# Patient Record
Sex: MALE | Race: WHITE | NOT HISPANIC OR LATINO | Employment: OTHER | URBAN - METROPOLITAN AREA
[De-identification: names, ages, dates, MRNs, and addresses within clinical notes are randomized per-mention and may not be internally consistent; named-entity substitution may affect disease eponyms.]

---

## 2023-08-23 ENCOUNTER — OFFICE VISIT (OUTPATIENT)
Dept: PRIMARY CARE | Facility: CLINIC | Age: 78
End: 2023-08-23
Payer: MEDICARE

## 2023-08-23 VITALS
BODY MASS INDEX: 26.28 KG/M2 | SYSTOLIC BLOOD PRESSURE: 134 MMHG | HEIGHT: 69 IN | TEMPERATURE: 97.5 F | DIASTOLIC BLOOD PRESSURE: 78 MMHG | WEIGHT: 177.4 LBS

## 2023-08-23 DIAGNOSIS — C61 MALIGNANT NEOPLASM OF PROSTATE (MULTI): Primary | ICD-10-CM

## 2023-08-23 DIAGNOSIS — E55.9 VITAMIN D DEFICIENCY: ICD-10-CM

## 2023-08-23 DIAGNOSIS — L50.1 IDIOPATHIC URTICARIA: ICD-10-CM

## 2023-08-23 DIAGNOSIS — E78.2 MIXED HYPERLIPIDEMIA: ICD-10-CM

## 2023-08-23 DIAGNOSIS — J44.89 ASTHMA WITH CHRONIC OBSTRUCTIVE PULMONARY DISEASE (COPD) (MULTI): ICD-10-CM

## 2023-08-23 PROBLEM — J45.40 MODERATE PERSISTENT ASTHMA WITHOUT COMPLICATION (HHS-HCC): Status: ACTIVE | Noted: 2023-08-23

## 2023-08-23 PROCEDURE — 99213 OFFICE O/P EST LOW 20 MIN: CPT | Performed by: STUDENT IN AN ORGANIZED HEALTH CARE EDUCATION/TRAINING PROGRAM

## 2023-08-23 PROCEDURE — 1160F RVW MEDS BY RX/DR IN RCRD: CPT | Performed by: STUDENT IN AN ORGANIZED HEALTH CARE EDUCATION/TRAINING PROGRAM

## 2023-08-23 PROCEDURE — 1036F TOBACCO NON-USER: CPT | Performed by: STUDENT IN AN ORGANIZED HEALTH CARE EDUCATION/TRAINING PROGRAM

## 2023-08-23 PROCEDURE — 1159F MED LIST DOCD IN RCRD: CPT | Performed by: STUDENT IN AN ORGANIZED HEALTH CARE EDUCATION/TRAINING PROGRAM

## 2023-08-23 RX ORDER — TAMSULOSIN HYDROCHLORIDE 0.4 MG/1
0.8 CAPSULE ORAL DAILY
Qty: 180 CAPSULE | Refills: 3 | Status: SHIPPED | OUTPATIENT
Start: 2023-08-23

## 2023-08-23 RX ORDER — TAMSULOSIN HYDROCHLORIDE 0.4 MG/1
2 CAPSULE ORAL DAILY
COMMUNITY
Start: 2015-06-11 | End: 2023-08-23 | Stop reason: SDUPTHER

## 2023-08-23 RX ORDER — FLUTICASONE PROPIONATE 220 UG/1
1 AEROSOL, METERED RESPIRATORY (INHALATION) DAILY
COMMUNITY
Start: 2016-02-19 | End: 2024-06-06

## 2023-08-23 RX ORDER — ASPIRIN 81 MG/1
81 TABLET ORAL
COMMUNITY

## 2023-08-23 RX ORDER — OMALIZUMAB 150 MG/ML
INJECTION, SOLUTION SUBCUTANEOUS
COMMUNITY
Start: 2022-08-23

## 2023-08-23 RX ORDER — ALBUTEROL SULFATE 90 UG/1
2 AEROSOL, METERED RESPIRATORY (INHALATION) EVERY 4 HOURS PRN
COMMUNITY
Start: 2017-07-20

## 2023-08-23 RX ORDER — MONTELUKAST SODIUM 10 MG/1
10 TABLET ORAL EVERY 24 HOURS
COMMUNITY
Start: 2013-02-15

## 2023-08-23 RX ORDER — TAMSULOSIN HYDROCHLORIDE 0.4 MG/1
0.8 CAPSULE ORAL DAILY
Qty: 180 CAPSULE | Refills: 3 | Status: SHIPPED | OUTPATIENT
Start: 2023-08-23 | End: 2023-11-29 | Stop reason: SDUPTHER

## 2023-08-23 ASSESSMENT — PATIENT HEALTH QUESTIONNAIRE - PHQ9
2. FEELING DOWN, DEPRESSED OR HOPELESS: NOT AT ALL
1. LITTLE INTEREST OR PLEASURE IN DOING THINGS: NOT AT ALL
SUM OF ALL RESPONSES TO PHQ9 QUESTIONS 1 AND 2: 0

## 2023-08-23 NOTE — PROGRESS NOTES
"Subjective   Patient ID: Moses Castro is a 78 y.o. male who presents for Follow-up ([facility] needs refill).    HPI   Patient here for on tamsulosin. He has been on it since 2015. Had prostate ca. He takes 2 capsules daily.  He does have an allergist and is on xolair. He ended up with chronic urticaria and has been xolair for about a year. He does his inhalers and singulair.     Had recent hernia surgery and cataract surgery as well. Cataracts in June and Hernia in March with Dr Batres.    Review of Systems   Constitutional:  Negative for chills, fatigue and fever.   Respiratory:  Negative for cough, shortness of breath and wheezing.    Cardiovascular:  Negative for chest pain, palpitations and leg swelling.   Gastrointestinal:  Negative for abdominal pain, constipation, diarrhea and nausea.   Genitourinary:  Negative for dysuria, frequency, hematuria and urgency.   Neurological:  Negative for dizziness, numbness and headaches.   Psychiatric/Behavioral:  Negative for dysphoric mood. The patient is not nervous/anxious.        Objective   /78   Temp 36.4 °C (97.5 °F)   Ht 1.759 m (5' 9.25\")   Wt 80.5 kg (177 lb 6.4 oz)   BMI 26.01 kg/m²     Physical Exam  Constitutional:       Appearance: Normal appearance.   HENT:      Head: Normocephalic and atraumatic.   Eyes:      Extraocular Movements: Extraocular movements intact.      Pupils: Pupils are equal, round, and reactive to light.   Cardiovascular:      Rate and Rhythm: Normal rate and regular rhythm.      Heart sounds: Normal heart sounds. No murmur heard.  Pulmonary:      Effort: Pulmonary effort is normal.      Breath sounds: Normal breath sounds. No wheezing.   Abdominal:      General: Bowel sounds are normal.      Palpations: Abdomen is soft.      Tenderness: There is no abdominal tenderness. There is no guarding.   Musculoskeletal:         General: Normal range of motion.   Skin:     General: Skin is warm and dry.   Neurological:      General: No focal " deficit present.      Mental Status: He is alert and oriented to person, place, and time.   Psychiatric:         Mood and Affect: Mood normal.         Behavior: Behavior normal.         Assessment/Plan   Problem List Items Addressed This Visit       Idiopathic urticaria    Malignant neoplasm of prostate (CMS/[facility]) - Primary    Relevant Medications    tamsulosin (Flomax) 0.4 mg 24 hr capsule    Other Relevant Orders    PSA    Vitamin D deficiency    Relevant Orders    Vitamin D 25 hydroxy    Asthma with chronic obstructive pulmonary disease (COPD) (CMS/[facility])     Other Visit Diagnoses       Mixed hyperlipidemia        Relevant Orders    Lipid Panel    Comprehensive Metabolic Panel    CBC          Medications refilled in office today.       Patient understands and agrees with treatment plan    Lisa Mina,    08/24/23

## 2023-08-23 NOTE — PATIENT INSTRUCTIONS
Tamsulosin refilled  It was very nice to meet you, we will see you back in a few months for Medicare physical with fasting blood work a few days ahead of time

## 2023-08-24 ASSESSMENT — ENCOUNTER SYMPTOMS
DYSPHORIC MOOD: 0
COUGH: 0
CONSTIPATION: 0
DYSURIA: 0
FEVER: 0
FREQUENCY: 0
DIARRHEA: 0
HEADACHES: 0
PALPITATIONS: 0
NERVOUS/ANXIOUS: 0
NAUSEA: 0
ABDOMINAL PAIN: 0
CHILLS: 0
SHORTNESS OF BREATH: 0
WHEEZING: 0
HEMATURIA: 0
NUMBNESS: 0
DIZZINESS: 0
FATIGUE: 0

## 2023-11-21 ENCOUNTER — LAB (OUTPATIENT)
Dept: LAB | Facility: LAB | Age: 78
End: 2023-11-21
Payer: MEDICARE

## 2023-11-21 DIAGNOSIS — E55.9 VITAMIN D DEFICIENCY: ICD-10-CM

## 2023-11-21 DIAGNOSIS — E78.2 MIXED HYPERLIPIDEMIA: ICD-10-CM

## 2023-11-21 DIAGNOSIS — C61 MALIGNANT NEOPLASM OF PROSTATE (MULTI): ICD-10-CM

## 2023-11-21 LAB
25(OH)D3 SERPL-MCNC: 24 NG/ML (ref 30–100)
ALBUMIN SERPL BCP-MCNC: 4.4 G/DL (ref 3.4–5)
ALP SERPL-CCNC: 62 U/L (ref 33–136)
ALT SERPL W P-5'-P-CCNC: 23 U/L (ref 10–52)
ANION GAP SERPL CALC-SCNC: 11 MMOL/L (ref 10–20)
AST SERPL W P-5'-P-CCNC: 19 U/L (ref 9–39)
BILIRUB SERPL-MCNC: 0.7 MG/DL (ref 0–1.2)
BUN SERPL-MCNC: 20 MG/DL (ref 6–23)
CALCIUM SERPL-MCNC: 9.1 MG/DL (ref 8.6–10.3)
CHLORIDE SERPL-SCNC: 104 MMOL/L (ref 98–107)
CHOLEST SERPL-MCNC: 242 MG/DL (ref 0–199)
CHOLESTEROL/HDL RATIO: 4
CO2 SERPL-SCNC: 30 MMOL/L (ref 21–32)
CREAT SERPL-MCNC: 1.04 MG/DL (ref 0.5–1.3)
ERYTHROCYTE [DISTWIDTH] IN BLOOD BY AUTOMATED COUNT: 14.1 % (ref 11.5–14.5)
GFR SERPL CREATININE-BSD FRML MDRD: 73 ML/MIN/1.73M*2
GLUCOSE SERPL-MCNC: 99 MG/DL (ref 74–99)
HCT VFR BLD AUTO: 51.3 % (ref 41–52)
HDLC SERPL-MCNC: 60.5 MG/DL
HGB BLD-MCNC: 16.8 G/DL (ref 13.5–17.5)
LDLC SERPL CALC-MCNC: 156 MG/DL
MCH RBC QN AUTO: 31.2 PG (ref 26–34)
MCHC RBC AUTO-ENTMCNC: 32.7 G/DL (ref 32–36)
MCV RBC AUTO: 95 FL (ref 80–100)
NON HDL CHOLESTEROL: 182 MG/DL (ref 0–149)
NRBC BLD-RTO: 0 /100 WBCS (ref 0–0)
PLATELET # BLD AUTO: 216 X10*3/UL (ref 150–450)
POTASSIUM SERPL-SCNC: 4.9 MMOL/L (ref 3.5–5.3)
PROT SERPL-MCNC: 6.3 G/DL (ref 6.4–8.2)
PSA SERPL-MCNC: <0.01 NG/ML
RBC # BLD AUTO: 5.39 X10*6/UL (ref 4.5–5.9)
SODIUM SERPL-SCNC: 140 MMOL/L (ref 136–145)
TRIGL SERPL-MCNC: 129 MG/DL (ref 0–149)
VLDL: 26 MG/DL (ref 0–40)
WBC # BLD AUTO: 5.2 X10*3/UL (ref 4.4–11.3)

## 2023-11-21 PROCEDURE — 84153 ASSAY OF PSA TOTAL: CPT

## 2023-11-21 PROCEDURE — 36415 COLL VENOUS BLD VENIPUNCTURE: CPT

## 2023-11-21 PROCEDURE — 80053 COMPREHEN METABOLIC PANEL: CPT

## 2023-11-21 PROCEDURE — 80061 LIPID PANEL: CPT

## 2023-11-21 PROCEDURE — 85027 COMPLETE CBC AUTOMATED: CPT

## 2023-11-21 PROCEDURE — 82306 VITAMIN D 25 HYDROXY: CPT

## 2023-11-29 ENCOUNTER — OFFICE VISIT (OUTPATIENT)
Dept: PRIMARY CARE | Facility: CLINIC | Age: 78
End: 2023-11-29
Payer: MEDICARE

## 2023-11-29 VITALS
TEMPERATURE: 97.9 F | BODY MASS INDEX: 26.36 KG/M2 | WEIGHT: 178 LBS | SYSTOLIC BLOOD PRESSURE: 138 MMHG | DIASTOLIC BLOOD PRESSURE: 64 MMHG | OXYGEN SATURATION: 95 % | HEIGHT: 69 IN | HEART RATE: 81 BPM

## 2023-11-29 DIAGNOSIS — M77.11 LATERAL EPICONDYLITIS OF RIGHT ELBOW: ICD-10-CM

## 2023-11-29 DIAGNOSIS — Z00.00 MEDICARE ANNUAL WELLNESS VISIT, SUBSEQUENT: Primary | ICD-10-CM

## 2023-11-29 DIAGNOSIS — E55.9 VITAMIN D DEFICIENCY: ICD-10-CM

## 2023-11-29 DIAGNOSIS — H61.21 IMPACTED CERUMEN OF RIGHT EAR: ICD-10-CM

## 2023-11-29 PROCEDURE — 69209 REMOVE IMPACTED EAR WAX UNI: CPT | Performed by: STUDENT IN AN ORGANIZED HEALTH CARE EDUCATION/TRAINING PROGRAM

## 2023-11-29 PROCEDURE — 1036F TOBACCO NON-USER: CPT | Performed by: STUDENT IN AN ORGANIZED HEALTH CARE EDUCATION/TRAINING PROGRAM

## 2023-11-29 PROCEDURE — 99214 OFFICE O/P EST MOD 30 MIN: CPT | Performed by: STUDENT IN AN ORGANIZED HEALTH CARE EDUCATION/TRAINING PROGRAM

## 2023-11-29 PROCEDURE — 1160F RVW MEDS BY RX/DR IN RCRD: CPT | Performed by: STUDENT IN AN ORGANIZED HEALTH CARE EDUCATION/TRAINING PROGRAM

## 2023-11-29 PROCEDURE — 1159F MED LIST DOCD IN RCRD: CPT | Performed by: STUDENT IN AN ORGANIZED HEALTH CARE EDUCATION/TRAINING PROGRAM

## 2023-11-29 PROCEDURE — 1170F FXNL STATUS ASSESSED: CPT | Performed by: STUDENT IN AN ORGANIZED HEALTH CARE EDUCATION/TRAINING PROGRAM

## 2023-11-29 PROCEDURE — G0439 PPPS, SUBSEQ VISIT: HCPCS | Performed by: STUDENT IN AN ORGANIZED HEALTH CARE EDUCATION/TRAINING PROGRAM

## 2023-11-29 ASSESSMENT — ENCOUNTER SYMPTOMS
NUMBNESS: 0
DIARRHEA: 0
DYSURIA: 0
NERVOUS/ANXIOUS: 0
WHEEZING: 0
CHILLS: 0
HEADACHES: 0
OCCASIONAL FEELINGS OF UNSTEADINESS: 0
NAUSEA: 0
SHORTNESS OF BREATH: 0
HEMATURIA: 0
PALPITATIONS: 0
DIZZINESS: 0
FREQUENCY: 0
LOSS OF SENSATION IN FEET: 0
DYSPHORIC MOOD: 0
FEVER: 0
ABDOMINAL PAIN: 0
CONSTIPATION: 0
FATIGUE: 0
DEPRESSION: 0
COUGH: 0

## 2023-11-29 ASSESSMENT — PATIENT HEALTH QUESTIONNAIRE - PHQ9
1. LITTLE INTEREST OR PLEASURE IN DOING THINGS: NOT AT ALL
2. FEELING DOWN, DEPRESSED OR HOPELESS: NOT AT ALL
SUM OF ALL RESPONSES TO PHQ9 QUESTIONS 1 AND 2: 0

## 2023-11-29 ASSESSMENT — ACTIVITIES OF DAILY LIVING (ADL)
DOING_HOUSEWORK: INDEPENDENT
TAKING_MEDICATION: INDEPENDENT
BATHING: INDEPENDENT
MANAGING_FINANCES: INDEPENDENT
DRESSING: INDEPENDENT
GROCERY_SHOPPING: INDEPENDENT

## 2023-11-29 NOTE — PATIENT INSTRUCTIONS
Recommendations for men annual wellness exam:   Colonoscopy at age 45 or earlier if positive family history of colon cancer   Discuss prostate cancer screening between ages 55-69 or sooner if family history of prostate cancer   One time screen for abdominal aortic aneurysm for men ages 65-75 who have ever smoked  Screening for lung cancer with low-dose CT in all adults age 55-77 who have a 30 pack-year smoking history and currently smoke or have quit within the past 15 years  Follow a healthy diet (Dash diet, Mediterranean diet)  Exercise 150 min/wk  Maintain healthy weight (BMI < 25)  Do not smoke   Alcohol in moderation (up to 2 drinks/day)   Get enough sleep (7-8 hours/night)  Make sure immunizations are up to date (influenza, pneumococcal, Tdap, covid, shingles if age > 50, RSV if >60)  Visit dentist twice yearly    Home exercises given for tennis elbow.  If not improving can consider occupational/physical therapy and/or a corticosteroid injection    Recommend 4000 units of vitamin D daily

## 2023-11-29 NOTE — PROGRESS NOTES
Subjective   Reason for Visit: Moses Castro is an 78 y.o. male here for a Medicare Wellness visit.     Reviewed all medications by prescribing practitioner or clinical pharmacist (such as prescriptions, OTCs, herbal therapies and supplements) and documented in the medical record.    HPI  Specialists seen by patient: Ophthalmology  -derm  -allergist, xolair shots for idiopathic urticaria    Hx of colon ca screening: n/a  History of depression or anxiety:no  Immunizations: up to date  Diet: Follows a healthy diet  Exercise: Gets regular exercise, walks 1 1/2 miles daily   Alcohol abuse screen:   none   How many times in the past year 5 or more drinks in a day? 0  Lung cancer screening:   Smoking history: ex-smoker and quit in 1968  AAA US: not applicable  Drug use: No    Patient Self Assessment of Health Status  Patient Self Assessment: Good  Functional Ability/Level of Safety  Cognitive Impairment Observed: No cognitive impairment observed  Falls Home Safety Risk Factors  Home Safety Risk Factors: None  Nutrition and Exercise  Current Diet: Well Balanced Diet  Adequate Fluid Intake: Yes  Caffeine: Yes  Exercise Frequency: Regularly  ADL Screening  Hearing - Right Ear: Functional  Hearing - Left Ear: Functional  Bathing: Independent  Dressing: Independent  Walks in Home: Independent  IADL's  Managing Finances: Independent  Grocery Shopping: Independent  Taking Medication: Independent  Doing Housework: Independent      Patient also having issues with her right lateral elbow pain.  Has had tennis elbow in the past and it would normally go away on its own but this 1 has been persisting for several months.  He takes ibuprofen with mild relief but the pain returned.  He is very active and golfs.  Worse with opening jars and turning door handles.    Patient Care Team:  Lisa Mina DO as PCP - General (Family Medicine)  Carlos Snyder MD as PCP - [facility] Attributed Provider     Review of Systems   Constitutional:  " Negative for chills, fatigue and fever.   Respiratory:  Negative for cough, shortness of breath and wheezing.    Cardiovascular:  Negative for chest pain, palpitations and leg swelling.   Gastrointestinal:  Negative for abdominal pain, constipation, diarrhea and nausea.   Genitourinary:  Negative for dysuria, frequency, hematuria and urgency.   Neurological:  Negative for dizziness, numbness and headaches.   Psychiatric/Behavioral:  Negative for dysphoric mood. The patient is not nervous/anxious.        Objective   Vitals:  /64 (BP Location: Left arm, Patient Position: Sitting, BP Cuff Size: Adult)   Pulse 81   Temp 36.6 °C (97.9 °F) (Skin)   Ht 1.74 m (5' 8.5\")   Wt 80.7 kg (178 lb)   SpO2 95%   BMI 26.67 kg/m²       Physical Exam  Constitutional:       General: He is not in acute distress.     Appearance: Normal appearance.   HENT:      Head: Normocephalic and atraumatic.      Right Ear: There is impacted cerumen.      Left Ear: Tympanic membrane and ear canal normal.      Mouth/Throat:      Mouth: Mucous membranes are moist.      Pharynx: No posterior oropharyngeal erythema.   Eyes:      Extraocular Movements: Extraocular movements intact.      Pupils: Pupils are equal, round, and reactive to light.   Cardiovascular:      Rate and Rhythm: Normal rate and regular rhythm.      Heart sounds: No murmur heard.  Pulmonary:      Effort: Pulmonary effort is normal. No respiratory distress.      Breath sounds: Normal breath sounds. No wheezing.   Abdominal:      General: Bowel sounds are normal.      Palpations: Abdomen is soft.      Tenderness: There is no abdominal tenderness. There is no guarding.   Musculoskeletal:         General: Normal range of motion.      Right elbow: No swelling or effusion. Normal range of motion. Tenderness present in lateral epicondyle.      Left elbow: No swelling or effusion. Normal range of motion. No tenderness.      Cervical back: Neck supple.      Comments: Strength 5/5.  " Increased pain with resisted wrist extension, pronation, and middle finger extension.   Skin:     General: Skin is warm and dry.   Neurological:      General: No focal deficit present.      Mental Status: He is alert and oriented to person, place, and time.   Psychiatric:         Mood and Affect: Mood normal.         Behavior: Behavior normal.       Assessment/Plan   Problem List Items Addressed This Visit       Vitamin D deficiency     Other Visit Diagnoses       Medicare annual wellness visit, subsequent    -  Primary    Impacted cerumen of right ear        Relevant Orders    Ear Cerumen Removal    Lateral epicondylitis of right elbow              We will obtain fasting blood work.  Results will be communicated to the patient via MyChart or a letter.   We reviewed appropriate preventative health screening guidelines. The patient is  up-to-date on vaccinations, recommended n/a.  We discussed regular aerobic exercise. We discussed proper nutrition and weight control.     Home exercises given for lateral epicondylitis.  If not improving can consider occupational therapy and/or corticosteroid injection.  He will let me know    Recommended 4000 units of vitamin D daily

## 2023-11-29 NOTE — PROGRESS NOTES
Patient ID: Moses Castro is a 78 y.o. male.    Ear Cerumen Removal    Date/Time: 11/29/2023 8:25 AM    Performed by: Lisa Mina DO  Authorized by: Lisa Mina DO    Consent:     Consent obtained:  Verbal    Consent given by:  Patient    Risks, benefits, and alternatives were discussed: yes    Universal protocol:     Patient identity confirmed:  Verbally with patient  Procedure details:     Location:  R ear    Procedure type: irrigation      Procedure outcomes: cerumen removed    Post-procedure details:     Inspection:  TM intact    Hearing quality:  Normal    Procedure completion:  Tolerated

## 2024-08-25 DIAGNOSIS — C61 MALIGNANT NEOPLASM OF PROSTATE (MULTI): ICD-10-CM

## 2024-08-26 RX ORDER — TAMSULOSIN HYDROCHLORIDE 0.4 MG/1
0.8 CAPSULE ORAL DAILY
Qty: 180 CAPSULE | Refills: 3 | Status: SHIPPED | OUTPATIENT
Start: 2024-08-26

## 2024-09-04 NOTE — PROGRESS NOTES
HPI:  Proc (7/23/15): prostate biopsy performed by Dr. 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 Patient was a previous patient of Dr. Jasiel Melendez. Hx of asthma, COPD. Reports LUTS and intermittent hematuria (last episode 4 years ago). Nocturia 2-3x. Patient self-catheterizes. Patient is having hernia repair soon.    PSA: <0.10 (11/21/24)    Review of Systems:  All systems reviewed. Anything negative noted in the HPI.    Physical Exam:  Vitals signs reviewed.  Constitutional:      Appearance: Well-developed.  HENT:     Head: Normocephalic and atraumatic.  Neck:     Musculoskeletal: Normal range of motion.  Pulmonary:     Effort: Pulmonary effort is normal.  Musculoskeletal: Normal range of motion.  Skin:     General: Skin is warm and dry.  Neurological:     Mental Status: Alert and oriented to person, place, and time.  Psychiatric:        Behavior: Behavior normal.        Thought Content: Thought content normal.        Judgment: Judgment normal.    Procedures:    Assessment/Plan   Moses Castro is a 79 y.o. 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 Patient was a previous patient of Dr. Jasiel Melendez. Hx of asthma, COPD. Reports LUTS and intermittent hematuria (last episode 4 years ago). Nocturia 2-3x. Patient self-catheterizes. Management options including risks, benefits and alternatives discussed at length and all questions answered. Patient prefers to proceed with follow-up as needed.             Scribe Attestation:  By signing my name below, Suni MELVIN Scribe   attest that this documentation has been prepared under the direction and in the presence of Lalo Quesada MD.

## 2024-09-05 ENCOUNTER — APPOINTMENT (OUTPATIENT)
Dept: UROLOGY | Facility: CLINIC | Age: 79
End: 2024-09-05
Payer: MEDICARE

## 2024-09-05 VITALS — TEMPERATURE: 97.8 F | BODY MASS INDEX: 25.91 KG/M2 | WEIGHT: 181 LBS | HEIGHT: 70 IN

## 2024-09-05 DIAGNOSIS — C61 MALIGNANT NEOPLASM OF PROSTATE (MULTI): Primary | ICD-10-CM

## 2024-09-05 LAB
POC APPEARANCE, URINE: CLEAR
POC BILIRUBIN, URINE: NEGATIVE
POC BLOOD, URINE: NEGATIVE
POC COLOR, URINE: YELLOW
POC GLUCOSE, URINE: NEGATIVE MG/DL
POC KETONES, URINE: NEGATIVE MG/DL
POC LEUKOCYTES, URINE: NEGATIVE
POC NITRITE,URINE: NEGATIVE
POC PH, URINE: 7 PH
POC PROTEIN, URINE: NEGATIVE MG/DL
POC SPECIFIC GRAVITY, URINE: 1.01
POC UROBILINOGEN, URINE: 0.2 EU/DL

## 2024-09-05 PROCEDURE — 81002 URINALYSIS NONAUTO W/O SCOPE: CPT | Performed by: STUDENT IN AN ORGANIZED HEALTH CARE EDUCATION/TRAINING PROGRAM

## 2024-09-05 PROCEDURE — 51798 US URINE CAPACITY MEASURE: CPT | Performed by: STUDENT IN AN ORGANIZED HEALTH CARE EDUCATION/TRAINING PROGRAM

## 2024-09-05 PROCEDURE — 1159F MED LIST DOCD IN RCRD: CPT | Performed by: STUDENT IN AN ORGANIZED HEALTH CARE EDUCATION/TRAINING PROGRAM

## 2024-09-05 PROCEDURE — 99204 OFFICE O/P NEW MOD 45 MIN: CPT | Performed by: STUDENT IN AN ORGANIZED HEALTH CARE EDUCATION/TRAINING PROGRAM

## 2024-09-05 PROCEDURE — 1126F AMNT PAIN NOTED NONE PRSNT: CPT | Performed by: STUDENT IN AN ORGANIZED HEALTH CARE EDUCATION/TRAINING PROGRAM

## 2024-09-05 PROCEDURE — G2211 COMPLEX E/M VISIT ADD ON: HCPCS | Performed by: STUDENT IN AN ORGANIZED HEALTH CARE EDUCATION/TRAINING PROGRAM

## 2024-09-05 ASSESSMENT — PAIN SCALES - GENERAL: PAINLEVEL: 0-NO PAIN

## 2024-11-07 ENCOUNTER — PATIENT OUTREACH (OUTPATIENT)
Dept: PRIMARY CARE | Facility: CLINIC | Age: 79
End: 2024-11-07
Payer: MEDICARE

## 2024-11-07 NOTE — PROGRESS NOTES
Discharge Facility: [facility]  Discharge Diagnosis: NSTEMI  Admission Date: 24 Oct 24   Discharge Date: 6 Nov 24    PCP Appointment Date: 8 Nov 24 (Set by another)  Specialist Appointment Date: 14 Nov 24 (CardiologyKrzysztof @ [facility])  Hospital Encounter and Summary Linked: Yes    No call due to follow being within 2 days of discharge (8 Nov 24). Will make post PCP appt call

## 2024-11-08 ENCOUNTER — APPOINTMENT (OUTPATIENT)
Dept: PRIMARY CARE | Facility: CLINIC | Age: 79
End: 2024-11-08
Payer: MEDICARE

## 2024-11-13 ENCOUNTER — PATIENT OUTREACH (OUTPATIENT)
Dept: PRIMARY CARE | Facility: CLINIC | Age: 79
End: 2024-11-13
Payer: MEDICARE

## 2024-11-13 RX ORDER — ATORVASTATIN CALCIUM 40 MG/1
1 TABLET, FILM COATED ORAL NIGHTLY
COMMUNITY
Start: 2024-11-06 | End: 2025-02-04

## 2024-11-13 RX ORDER — DILTIAZEM HYDROCHLORIDE 60 MG/1
60 TABLET, FILM COATED ORAL 2 TIMES DAILY
COMMUNITY
Start: 2024-11-12 | End: 2024-12-12

## 2024-11-13 RX ORDER — ACETAMINOPHEN 500 MG
1000 TABLET ORAL 4 TIMES DAILY
COMMUNITY
Start: 2024-11-06 | End: 2024-12-06

## 2024-11-13 RX ORDER — CLOPIDOGREL BISULFATE 75 MG/1
75 TABLET ORAL
COMMUNITY
Start: 2024-11-07 | End: 2025-11-07

## 2024-11-13 RX ORDER — LANOLIN ALCOHOL/MO/W.PET/CERES
400 CREAM (GRAM) TOPICAL
COMMUNITY
Start: 2024-11-07 | End: 2024-12-07

## 2024-11-13 RX ORDER — AMOXICILLIN 250 MG
1 CAPSULE ORAL 2 TIMES DAILY
COMMUNITY
Start: 2024-11-06 | End: 2024-11-13

## 2024-11-13 RX ORDER — METOPROLOL TARTRATE 25 MG/1
25 TABLET, FILM COATED ORAL 2 TIMES DAILY
COMMUNITY
Start: 2024-11-06 | End: 2024-12-06

## 2024-11-13 NOTE — PROGRESS NOTES
Discharge Facility: [facility] Diagnosis: Afib ([facility]) [I48.91]   Admission Date: 11-11-24  Discharge Date: 11-12-24     PCP Appointment Date: Pt. Declined to schedule     Specialist Appointment Date: Friday November 15, 2024 1:00 PM Post Op with Zari Hall APRN.CNP [facility] ([facility])  Hospital Encounter and Summary Linked: Yes    See discharge assessment below for further details    Medications  Medications reviewed with patient/caregiver?: Yes (11/13/2024 12:22 PM)  Is the patient having any side effects they believe may be caused by any medication additions or changes?: No (11/13/2024 12:22 PM)  Does the patient have all medications ordered at discharge?: Yes (11/13/2024 12:22 PM)  Care Management Interventions: No intervention needed (11/13/2024 12:22 PM)  Prescription Comments: Pt. reports he has all of his medications. (11/13/2024 12:22 PM)  Is the patient taking all medications as directed (includes completed medication regime)?: Yes (11/13/2024 12:22 PM)  Medication Comments: Pt. reoprts he is getting very good care from his prescribing doctors and he has no medication concerns (11/13/2024 12:22 PM)    Appointments  Does the patient have a primary care provider?: Yes (11/13/2024 12:22 PM)  Care Management Interventions: -- (Pt. declined.) (11/13/2024 12:22 PM)  Has the patient kept scheduled appointments due by today?: Not applicable (11/13/2024 12:22 PM)    Self Management  What is the home health agency?: Unsure of agency. Pt. reports he has not started [facility] yet, but he has been in touch with them. (11/13/2024 12:22 PM)  Has home health visited the patient within 72 hours of discharge?: Yes (11/13/2024 12:22 PM)  What Durable Medical Equipment (DME) was ordered?: n/a (11/13/2024 12:22 PM)    Patient Teaching  Does the patient have access to their discharge instructions?: Yes (11/13/2024 12:22 PM)  Care Management Interventions: Reviewed  instructions with patient (11/13/2024 12:22 PM)  What is the patient's perception of their health status since discharge?: Improving (11/13/2024 12:22 PM)  Patient/Caregiver Education Comments: Pt. was in the hospital prior to this visit for ([facility] d/c packet)  Principal Problem:   NSTEMI (non-ST elevated myocardial infarction) (HCC) Active Problems:   Essential hypertension   Mixed hyperlipidemia   History of asthma   Coronary artery disease involving native coronary artery of native heart   Chronic urticaria   S/P CABG x 3   Acute post-operative pain Resolved Problems:   Respiratory insufficiency OPERATIONS PERFORMED WHILE IN THE HOSPITAL: S/p CABG x 3 (lima-lad, svg-OM1, svg-PDA;evh) w/ Dr. Smith on 11/1/24 IMPORTANT TEST/PROCEDURES: Echocardiogram and Heart Catheterization during hospitalization  Pt. reports he is doing well. He is up and walking every day. He reports that he has follow ups schedule with all of his doctors caring for him at the [facility] in regards to his cardiac needs. He reports he takes tylenol for his pain and it is manageable. (11/13/2024 12:22 PM)

## 2024-11-26 ENCOUNTER — PATIENT OUTREACH (OUTPATIENT)
Dept: PRIMARY CARE | Facility: CLINIC | Age: 79
End: 2024-11-26
Payer: MEDICARE

## 2024-11-26 NOTE — PROGRESS NOTES
Pt. Declined TCM appt. At first call and wished to follow up with cardiology. Pt. Was seen by Cardiology on 11-15-24. TCM Closed at this time.

## 2025-01-20 ASSESSMENT — ENCOUNTER SYMPTOMS
FATIGUE: 0
NUMBNESS: 0
NAUSEA: 0
HEADACHES: 0
HEMATURIA: 0
FREQUENCY: 0
SHORTNESS OF BREATH: 0
DYSPHORIC MOOD: 0
ABDOMINAL PAIN: 0
FEVER: 0
COUGH: 0
DYSURIA: 0
CHILLS: 0
NERVOUS/ANXIOUS: 0
WHEEZING: 0
PALPITATIONS: 0
CONSTIPATION: 0
DIARRHEA: 0
DIZZINESS: 0

## 2025-01-20 NOTE — PROGRESS NOTES
Subjective   Reason for Visit: Moses Castro is an 79 y.o. male here for a Medicare Wellness visit.     Reviewed all medications by prescribing practitioner or clinical pharmacist (such as prescriptions, OTCs, herbal therapies and supplements) and documented in the medical record.    HPI  Specialists seen by patient: Ophthalmology  -derm  -allergist, xolair shots for idiopathic urticaria  -Cardiology: S/p CABG    Hx of colon ca screening: n/a  History of depression or anxiety:no  Immunizations: Prevnar 20 will get a pharmacy  Diet: Follows a healthy diet  Exercise: Gets regular exercise, cardiac rehab   Alcohol abuse screen:   none   How many times in the past year 5 or more drinks in a day? 0  Lung cancer screening:   Smoking history: ex-smoker and quit in 1968  AAA US: not applicable  Drug use: No  Phq9: 0  HCPOA: Yes, reviewed    Patient Self Assessment of Health Status  Patient Self Assessment: Good  Functional Ability/Level of Safety  Cognitive Impairment Observed: No cognitive impairment observed  Falls Home Safety Risk Factors  Home Safety Risk Factors: Loose rugs  Nutrition and Exercise  Current Diet: Well Balanced Diet  Adequate Fluid Intake: Yes  Caffeine: Yes  Exercise Frequency: Regularly  ADL Screening  Hearing - Right Ear: Functional  Hearing - Left Ear: Functional  Bathing: Independent  Dressing: Independent  Walks in Home: Independent  IADL's  Managing Finances: Independent  Grocery Shopping: Independent  Taking Medication: Independent  Doing Housework: Independent      Had a CABG x 3 on 11/1/24. He then had afib/flutter on 11/11/24 and converted after diltiazem. He had a run of it cardiac rehab as well. Now, currently has a holter on. Had an NSTEMI in Oct 2024 with a cath that showed multivessel CAD.    His blood pressures have been in the 80s to 90 systolic at cardiac rehab.    Patient Care Team:  Lisa Mina DO as PCP - General (Family Medicine)  Lisa Mina DO as PCP - [facility]  "Attributed Provider     Review of Systems   Constitutional:  Negative for chills, fatigue and fever.   Respiratory:  Negative for cough, shortness of breath and wheezing.    Cardiovascular:  Negative for chest pain, palpitations and leg swelling.   Gastrointestinal:  Negative for abdominal pain, constipation, diarrhea and nausea.   Genitourinary:  Negative for dysuria, frequency, hematuria and urgency.   Neurological:  Negative for dizziness, numbness and headaches.   Psychiatric/Behavioral:  Negative for dysphoric mood. The patient is not nervous/anxious.        Objective   Vitals:  /66 (BP Location: Left arm, Patient Position: Sitting, BP Cuff Size: Adult)   Pulse 75   Temp 36.3 °C (97.4 °F) (Temporal)   Ht 1.77 m (5' 9.69\")   Wt 79 kg (174 lb 3.2 oz)   SpO2 96%   BMI 25.22 kg/m²       Physical Exam  Constitutional:       General: He is not in acute distress.     Appearance: Normal appearance.   HENT:      Head: Normocephalic and atraumatic.      Right Ear: Tympanic membrane and ear canal normal.      Left Ear: Tympanic membrane and ear canal normal.      Mouth/Throat:      Mouth: Mucous membranes are moist.      Pharynx: No posterior oropharyngeal erythema.   Eyes:      Extraocular Movements: Extraocular movements intact.      Pupils: Pupils are equal, round, and reactive to light.   Cardiovascular:      Rate and Rhythm: Normal rate and regular rhythm.      Heart sounds: No murmur heard.  Pulmonary:      Effort: Pulmonary effort is normal. No respiratory distress.      Breath sounds: Normal breath sounds. No wheezing.   Abdominal:      General: Bowel sounds are normal.      Palpations: Abdomen is soft.      Tenderness: There is no abdominal tenderness. There is no guarding.   Musculoskeletal:         General: Normal range of motion.      Cervical back: Neck supple.   Skin:     General: Skin is warm and dry.   Neurological:      General: No focal deficit present.      Mental Status: He is alert and " oriented to person, place, and time.   Psychiatric:         Mood and Affect: Mood normal.         Behavior: Behavior normal.         Assessment/Plan   Problem List Items Addressed This Visit       Malignant neoplasm of prostate (Multi)    Asthma with chronic obstructive pulmonary disease (COPD) (Multi)     Other Visit Diagnoses       Medicare annual wellness visit, subsequent    -  Primary    NSTEMI (non-ST elevated myocardial infarction) (Multi)        Relevant Medications    dilTIAZem (Cardizem) 60 mg immediate release tablet    metoprolol tartrate (Lopressor) 25 mg tablet    Hx of CABG        Depression screening        Mixed hyperlipidemia                We will obtain fasting blood work.  Results will be communicated to the patient via MyChart or a letter.   We reviewed appropriate preventative health screening guidelines. We discussed regular aerobic exercise. We discussed proper nutrition and weight control.    Following with cardiology.  His blood pressure is getting quite low at cardiac rehab so I did ask that he contact the cardiologist about this.    PSAs have been normal.    Follows with allergy for his asthma    5-10 minutes were spent screening for depression using PHQ-2/PHQ-9 as documented in the chart

## 2025-01-22 ENCOUNTER — APPOINTMENT (OUTPATIENT)
Dept: PRIMARY CARE | Facility: CLINIC | Age: 80
End: 2025-01-22
Payer: MEDICARE

## 2025-01-22 VITALS
DIASTOLIC BLOOD PRESSURE: 66 MMHG | SYSTOLIC BLOOD PRESSURE: 120 MMHG | WEIGHT: 174.2 LBS | TEMPERATURE: 97.4 F | BODY MASS INDEX: 24.94 KG/M2 | HEART RATE: 75 BPM | OXYGEN SATURATION: 96 % | HEIGHT: 70 IN

## 2025-01-22 DIAGNOSIS — Z00.00 MEDICARE ANNUAL WELLNESS VISIT, SUBSEQUENT: Primary | ICD-10-CM

## 2025-01-22 DIAGNOSIS — Z95.1 HX OF CABG: ICD-10-CM

## 2025-01-22 DIAGNOSIS — E78.2 MIXED HYPERLIPIDEMIA: ICD-10-CM

## 2025-01-22 DIAGNOSIS — Z13.31 DEPRESSION SCREENING: ICD-10-CM

## 2025-01-22 DIAGNOSIS — I21.4 NSTEMI (NON-ST ELEVATED MYOCARDIAL INFARCTION) (MULTI): ICD-10-CM

## 2025-01-22 DIAGNOSIS — C61 MALIGNANT NEOPLASM OF PROSTATE (MULTI): ICD-10-CM

## 2025-01-22 DIAGNOSIS — J44.89 ASTHMA WITH CHRONIC OBSTRUCTIVE PULMONARY DISEASE (COPD) (MULTI): ICD-10-CM

## 2025-01-22 PROCEDURE — G0439 PPPS, SUBSEQ VISIT: HCPCS | Performed by: STUDENT IN AN ORGANIZED HEALTH CARE EDUCATION/TRAINING PROGRAM

## 2025-01-22 PROCEDURE — G0444 DEPRESSION SCREEN ANNUAL: HCPCS | Performed by: STUDENT IN AN ORGANIZED HEALTH CARE EDUCATION/TRAINING PROGRAM

## 2025-01-22 PROCEDURE — 1160F RVW MEDS BY RX/DR IN RCRD: CPT | Performed by: STUDENT IN AN ORGANIZED HEALTH CARE EDUCATION/TRAINING PROGRAM

## 2025-01-22 PROCEDURE — 99214 OFFICE O/P EST MOD 30 MIN: CPT | Performed by: STUDENT IN AN ORGANIZED HEALTH CARE EDUCATION/TRAINING PROGRAM

## 2025-01-22 PROCEDURE — 1170F FXNL STATUS ASSESSED: CPT | Performed by: STUDENT IN AN ORGANIZED HEALTH CARE EDUCATION/TRAINING PROGRAM

## 2025-01-22 PROCEDURE — 1124F ACP DISCUSS-NO DSCNMKR DOCD: CPT | Performed by: STUDENT IN AN ORGANIZED HEALTH CARE EDUCATION/TRAINING PROGRAM

## 2025-01-22 PROCEDURE — 1036F TOBACCO NON-USER: CPT | Performed by: STUDENT IN AN ORGANIZED HEALTH CARE EDUCATION/TRAINING PROGRAM

## 2025-01-22 PROCEDURE — 1159F MED LIST DOCD IN RCRD: CPT | Performed by: STUDENT IN AN ORGANIZED HEALTH CARE EDUCATION/TRAINING PROGRAM

## 2025-01-22 RX ORDER — DILTIAZEM HYDROCHLORIDE 60 MG/1
60 TABLET, FILM COATED ORAL 2 TIMES DAILY
COMMUNITY
Start: 2025-01-05

## 2025-01-22 RX ORDER — APIXABAN 5 MG/1
5 TABLET, FILM COATED ORAL 2 TIMES DAILY
COMMUNITY
Start: 2025-01-05

## 2025-01-22 RX ORDER — METOPROLOL TARTRATE 25 MG/1
0.5 TABLET, FILM COATED ORAL 2 TIMES DAILY
COMMUNITY

## 2025-01-22 ASSESSMENT — PATIENT HEALTH QUESTIONNAIRE - PHQ9
SUM OF ALL RESPONSES TO PHQ9 QUESTIONS 1 AND 2: 0
1. LITTLE INTEREST OR PLEASURE IN DOING THINGS: NOT AT ALL
2. FEELING DOWN, DEPRESSED OR HOPELESS: NOT AT ALL

## 2025-01-22 ASSESSMENT — ACTIVITIES OF DAILY LIVING (ADL)
TAKING_MEDICATION: INDEPENDENT
GROCERY_SHOPPING: INDEPENDENT
DOING_HOUSEWORK: INDEPENDENT
DRESSING: INDEPENDENT
BATHING: INDEPENDENT
MANAGING_FINANCES: INDEPENDENT

## 2025-01-22 ASSESSMENT — ENCOUNTER SYMPTOMS
DEPRESSION: 0
OCCASIONAL FEELINGS OF UNSTEADINESS: 0

## 2025-01-24 ENCOUNTER — LAB (OUTPATIENT)
Dept: LAB | Facility: LAB | Age: 80
End: 2025-01-24
Payer: MEDICARE

## 2025-01-24 DIAGNOSIS — I21.4 NSTEMI (NON-ST ELEVATED MYOCARDIAL INFARCTION) (MULTI): ICD-10-CM

## 2025-01-24 LAB
ALBUMIN SERPL BCP-MCNC: 4.4 G/DL (ref 3.4–5)
ALP SERPL-CCNC: 92 U/L (ref 33–136)
ALT SERPL W P-5'-P-CCNC: 17 U/L (ref 10–52)
ANION GAP SERPL CALC-SCNC: 11 MMOL/L (ref 10–20)
AST SERPL W P-5'-P-CCNC: 16 U/L (ref 9–39)
BILIRUB SERPL-MCNC: 0.5 MG/DL (ref 0–1.2)
BUN SERPL-MCNC: 19 MG/DL (ref 6–23)
CALCIUM SERPL-MCNC: 9 MG/DL (ref 8.6–10.3)
CHLORIDE SERPL-SCNC: 106 MMOL/L (ref 98–107)
CHOLEST SERPL-MCNC: 138 MG/DL (ref 0–199)
CHOLESTEROL/HDL RATIO: 2.5
CO2 SERPL-SCNC: 28 MMOL/L (ref 21–32)
CREAT SERPL-MCNC: 1.04 MG/DL (ref 0.5–1.3)
EGFRCR SERPLBLD CKD-EPI 2021: 72 ML/MIN/1.73M*2
ERYTHROCYTE [DISTWIDTH] IN BLOOD BY AUTOMATED COUNT: 14.9 % (ref 11.5–14.5)
GLUCOSE SERPL-MCNC: 94 MG/DL (ref 74–99)
HCT VFR BLD AUTO: 48 % (ref 41–52)
HDLC SERPL-MCNC: 55.1 MG/DL
HGB BLD-MCNC: 15 G/DL (ref 13.5–17.5)
LDLC SERPL CALC-MCNC: 66 MG/DL
MCH RBC QN AUTO: 29.9 PG (ref 26–34)
MCHC RBC AUTO-ENTMCNC: 31.3 G/DL (ref 32–36)
MCV RBC AUTO: 96 FL (ref 80–100)
NON HDL CHOLESTEROL: 83 MG/DL (ref 0–149)
NRBC BLD-RTO: 0 /100 WBCS (ref 0–0)
PLATELET # BLD AUTO: 213 X10*3/UL (ref 150–450)
POTASSIUM SERPL-SCNC: 4.5 MMOL/L (ref 3.5–5.3)
PROT SERPL-MCNC: 6.4 G/DL (ref 6.4–8.2)
RBC # BLD AUTO: 5.01 X10*6/UL (ref 4.5–5.9)
SODIUM SERPL-SCNC: 140 MMOL/L (ref 136–145)
TRIGL SERPL-MCNC: 83 MG/DL (ref 0–149)
VLDL: 17 MG/DL (ref 0–40)
WBC # BLD AUTO: 5.5 X10*3/UL (ref 4.4–11.3)

## 2025-01-24 PROCEDURE — 80061 LIPID PANEL: CPT

## 2025-01-24 PROCEDURE — 85027 COMPLETE CBC AUTOMATED: CPT

## 2025-01-24 PROCEDURE — 80053 COMPREHEN METABOLIC PANEL: CPT

## 2025-02-19 ENCOUNTER — TELEPHONE (OUTPATIENT)
Dept: PRIMARY CARE | Facility: CLINIC | Age: 80
End: 2025-02-19
Payer: MEDICARE

## 2025-02-19 NOTE — TELEPHONE ENCOUNTER
Pt called to state that he got his resent Holter Monitor results back and was told  all was fine, no flutter noted, no change in medications, and follow up with cardiology in June again, pt is being seen with the [facility].    Pt stated would like to see Dr Carter to get his opinion on everything and hopes to be seen before June if possible.    Please review and advise

## 2025-02-20 DIAGNOSIS — Z95.1 HX OF CABG: ICD-10-CM

## 2025-02-20 NOTE — TELEPHONE ENCOUNTER
Pt  notified that request for an apt with Dr Carter has been placed into the system, pt asked for office phone number of Dr Carter and number was given

## 2025-02-24 ENCOUNTER — OFFICE VISIT (OUTPATIENT)
Dept: CARDIOLOGY | Facility: CLINIC | Age: 80
End: 2025-02-24
Payer: MEDICARE

## 2025-02-24 VITALS
BODY MASS INDEX: 25.2 KG/M2 | DIASTOLIC BLOOD PRESSURE: 64 MMHG | TEMPERATURE: 97.7 F | HEART RATE: 83 BPM | HEIGHT: 70 IN | SYSTOLIC BLOOD PRESSURE: 102 MMHG | WEIGHT: 176 LBS

## 2025-02-24 DIAGNOSIS — I25.810 CORONARY ARTERY DISEASE INVOLVING CORONARY BYPASS GRAFT OF NATIVE HEART WITHOUT ANGINA PECTORIS: Primary | ICD-10-CM

## 2025-02-24 DIAGNOSIS — Z95.1 HX OF CABG: ICD-10-CM

## 2025-02-24 DIAGNOSIS — I48.92 ATRIAL FLUTTER, UNSPECIFIED TYPE (MULTI): ICD-10-CM

## 2025-02-24 DIAGNOSIS — E78.2 MIXED HYPERLIPIDEMIA: ICD-10-CM

## 2025-02-24 LAB
ATRIAL RATE: 81 BPM
P AXIS: 69 DEGREES
P OFFSET: 187 MS
P ONSET: 132 MS
PR INTERVAL: 156 MS
Q ONSET: 210 MS
QRS COUNT: 14 BEATS
QRS DURATION: 104 MS
QT INTERVAL: 374 MS
QTC CALCULATION(BAZETT): 434 MS
QTC FREDERICIA: 413 MS
R AXIS: 72 DEGREES
T AXIS: 47 DEGREES
T OFFSET: 397 MS
VENTRICULAR RATE: 81 BPM

## 2025-02-24 PROCEDURE — 1036F TOBACCO NON-USER: CPT | Performed by: INTERNAL MEDICINE

## 2025-02-24 PROCEDURE — 1123F ACP DISCUSS/DSCN MKR DOCD: CPT | Performed by: INTERNAL MEDICINE

## 2025-02-24 PROCEDURE — 99215 OFFICE O/P EST HI 40 MIN: CPT | Performed by: INTERNAL MEDICINE

## 2025-02-24 PROCEDURE — 99205 OFFICE O/P NEW HI 60 MIN: CPT | Performed by: INTERNAL MEDICINE

## 2025-02-24 PROCEDURE — 93005 ELECTROCARDIOGRAM TRACING: CPT | Performed by: INTERNAL MEDICINE

## 2025-02-24 PROCEDURE — 1160F RVW MEDS BY RX/DR IN RCRD: CPT | Performed by: INTERNAL MEDICINE

## 2025-02-24 PROCEDURE — 1159F MED LIST DOCD IN RCRD: CPT | Performed by: INTERNAL MEDICINE

## 2025-02-24 RX ORDER — ASPIRIN 81 MG/1
81 TABLET ORAL DAILY
COMMUNITY

## 2025-02-24 RX ORDER — ATORVASTATIN CALCIUM 40 MG/1
40 TABLET, FILM COATED ORAL DAILY
COMMUNITY
Start: 2025-01-28

## 2025-02-24 NOTE — PROGRESS NOTES
Chief Complaint:   Coronary Artery Disease     History of Present Illness     Moses Castro is a 79 y.o. male presenting with for evaluation of coronary artery disease.  Moses initially presented with an acute MI and underwent emergent CABG x 3 and LAAO (#40 clip) on 11/1/24.  The patient is tolerating guideline-directed medical therapy with antiplatelet and statin medication and is compliant.  The patient exercises regularly at cardiac rehab and follows a heart healthy diet.  The patient has been well and is not having any anginal symptoms or dyspnea on exertion.  No palpitations.    Review of Systems  All pertinent systems have been reviewed and are negative except for what is stated in the history of present illness.    All other systems have been reviewed and are negative and noncontributory to this patient's current ailments.  .       Previous History     Past Medical History:  He has a past medical history of Atrial flutter (Multi) (02/24/2025), Coronary artery disease involving coronary bypass graft of native heart without angina pectoris (02/24/2025), Frequency of micturition (12/14/2013), Hemorrhage of anus and rectum (02/20/2015), Mixed hyperlipidemia (02/24/2025), and Other specified abnormal findings of blood chemistry (02/19/2016).    Past Surgical History:  He has a past surgical history that includes Hernia repair; Colonoscopy; and Cataract extraction, bilateral.      Social History:  He reports that he quit smoking about 57 years ago. His smoking use included cigarettes. He started smoking about 65 years ago. He has a 4 pack-year smoking history. He has been exposed to tobacco smoke. He has never used smokeless tobacco. He reports that he does not currently use alcohol. He reports that he does not use drugs.    Family History:  Family History   Problem Relation Name Age of Onset    No Known Problems Mother      Alzheimer's disease Father      Leukemia Father          Allergies:  Mold, Cat dander, Dog  "dander, Grass pollen, House dust, Tree and shrub pollen, and Losartan potassium    Outpatient Medications:  Current Outpatient Medications   Medication Instructions    albuterol 90 mcg/actuation inhaler 2 puffs, Every 4 hours PRN    atorvastatin (LIPITOR) 40 mg, Daily    clopidogrel (PLAVIX) 75 mg, Daily RT    dilTIAZem (CARDIZEM) 60 mg, 2 times daily    Eliquis 5 mg, 2 times daily    metoprolol tartrate (Lopressor) 25 mg tablet 0.5 tablets, 2 times daily    multivit-min/ferrous fumarate (MULTI VITAMIN ORAL) 1 tablet    omalizumab (Xolair) 150 mg/mL injection Every 28 days    tamsulosin (FLOMAX) 0.8 mg, oral, Daily       Physical Examination   Vitals:  Visit Vitals  /64 (BP Location: Right arm)   Pulse 83   Temp 36.5 °C (97.7 °F)   Ht 1.778 m (5' 10\")   Wt 79.8 kg (176 lb)   BMI 25.25 kg/m²   Smoking Status Former   BSA 1.99 m²    Physical Exam  Vitals reviewed.   Constitutional:       General: He is not in acute distress.     Appearance: Normal appearance.   HENT:      Head: Normocephalic and atraumatic.      Nose: Nose normal.   Eyes:      Conjunctiva/sclera: Conjunctivae normal.   Cardiovascular:      Rate and Rhythm: Normal rate and regular rhythm.      Pulses: Normal pulses.      Heart sounds: No murmur heard.  Pulmonary:      Effort: Pulmonary effort is normal. No respiratory distress.      Breath sounds: Normal breath sounds. No wheezing, rhonchi or rales.   Abdominal:      General: Bowel sounds are normal. There is no distension.      Palpations: Abdomen is soft.      Tenderness: There is no abdominal tenderness.   Musculoskeletal:         General: No swelling.      Right lower leg: No edema.      Left lower leg: No edema.   Skin:     General: Skin is warm and dry.      Capillary Refill: Capillary refill takes less than 2 seconds.   Neurological:      General: No focal deficit present.      Mental Status: He is alert.   Psychiatric:         Mood and Affect: Mood normal.             Labs/Imaging/Cardiac " Studies   I have personally reviewed the patient's available lab work, primary care appointment notes, pertinent imaging studies, and cardiac studies and have discussed them and my independent interpretation of those results with the patient and caregiver at this appointment.  All pertinent recent Emergency Department evaluations and Hospital admissions were also reviewed in detail with the patient and caregiver.    Reviewed ECG, Labs, Hospital records, OP note    Echo:  No echocardiogram results found for the past 12 months       Assessment and Recommendations     Assessment/Plan       1. Coronary artery disease involving coronary bypass graft of native heart without angina pectoris (Primary)  The patient's CAD, as detailed in the HPI, has been clinically stable, without any anginal symptoms or dyspnea.  Progressing well since CABG and almost done with cardiac rehab.  The patient will continue treatment with guideline-directed medical therapy with ASA/Plavix (Plavix x 1 year) and statin medications and was advised regular exercise and a heart healthy diet.        - ECG 12 Lead    2. Hx of CABG    - Referral to Cardiology    3. Mixed hyperlipidemia  The patient's lipids are well controlled on chronic atorvatstatin therapy and they are meeting their goal LDL cholesterol per the ACC/AHA guidelines.      4. Atrial flutter, unspecified type (Multi)  Single episode of post-op atrial flutter Nov 2024 with subsequent negative Holter and no AF at rehab and s/p LAAO with Atriclip.  Stop Eliquis.  Stop Diltiazem.     Moses ALARCON Gloria will return in 6 months for an office visit.         Raul Carter MD    Exclusive of any other services or procedures performed, I, Raul Carter MD , spent 30 minutes in duration for this visit today.  This time consisted of chart review, obtaining history, and/or performing the exam as documented above as well as documenting the clinical information for the encounter in the electronic record,  discussing treatment options, plans, and/or goals with patient, family, and/or caregiver, refilling medications, updating the electronic record, ordering medicines, lab work, imaging, referrals, and/or procedures as documented above and communicating with other [facility] professionals. I have discussed the results of laboratory, radiology, and cardiology studies with the patient and their family/caregiver.

## 2025-04-08 ENCOUNTER — TELEPHONE (OUTPATIENT)
Dept: CARDIOLOGY | Facility: CLINIC | Age: 80
End: 2025-04-08
Payer: MEDICARE

## 2025-04-08 DIAGNOSIS — E78.2 MIXED HYPERLIPIDEMIA: Primary | ICD-10-CM

## 2025-04-08 RX ORDER — ATORVASTATIN CALCIUM 40 MG/1
40 TABLET, FILM COATED ORAL DAILY
Qty: 90 TABLET | Refills: 3 | Status: SHIPPED | OUTPATIENT
Start: 2025-04-08

## 2025-05-05 ENCOUNTER — TELEPHONE (OUTPATIENT)
Dept: CARDIOLOGY | Facility: CLINIC | Age: 80
End: 2025-05-05
Payer: MEDICARE

## 2025-05-05 DIAGNOSIS — I48.92 ATRIAL FLUTTER, UNSPECIFIED TYPE (MULTI): ICD-10-CM

## 2025-05-05 DIAGNOSIS — I25.810 CORONARY ARTERY DISEASE INVOLVING CORONARY BYPASS GRAFT OF NATIVE HEART WITHOUT ANGINA PECTORIS: Primary | ICD-10-CM

## 2025-05-05 RX ORDER — METOPROLOL TARTRATE 25 MG/1
12.5 TABLET, FILM COATED ORAL 2 TIMES DAILY
Qty: 90 TABLET | Refills: 3 | Status: SHIPPED | OUTPATIENT
Start: 2025-05-05

## 2025-08-25 ENCOUNTER — OFFICE VISIT (OUTPATIENT)
Dept: CARDIOLOGY | Facility: CLINIC | Age: 80
End: 2025-08-25
Payer: MEDICARE

## 2025-08-25 VITALS
HEIGHT: 70 IN | HEART RATE: 82 BPM | SYSTOLIC BLOOD PRESSURE: 134 MMHG | BODY MASS INDEX: 25.91 KG/M2 | TEMPERATURE: 97.7 F | WEIGHT: 181 LBS | DIASTOLIC BLOOD PRESSURE: 69 MMHG

## 2025-08-25 DIAGNOSIS — E78.2 MIXED HYPERLIPIDEMIA: ICD-10-CM

## 2025-08-25 DIAGNOSIS — I48.92 ATRIAL FLUTTER, UNSPECIFIED TYPE (MULTI): ICD-10-CM

## 2025-08-25 DIAGNOSIS — Z95.1 HX OF CABG: ICD-10-CM

## 2025-08-25 DIAGNOSIS — I25.810 CORONARY ARTERY DISEASE INVOLVING CORONARY BYPASS GRAFT OF NATIVE HEART WITHOUT ANGINA PECTORIS: ICD-10-CM

## 2025-08-25 PROCEDURE — 99212 OFFICE O/P EST SF 10 MIN: CPT

## 2025-08-25 PROCEDURE — 1159F MED LIST DOCD IN RCRD: CPT | Performed by: INTERNAL MEDICINE

## 2025-08-25 PROCEDURE — 1160F RVW MEDS BY RX/DR IN RCRD: CPT | Performed by: INTERNAL MEDICINE

## 2025-08-25 PROCEDURE — 99213 OFFICE O/P EST LOW 20 MIN: CPT | Performed by: INTERNAL MEDICINE

## 2025-09-05 ENCOUNTER — OFFICE VISIT (OUTPATIENT)
Dept: URGENT CARE | Age: 80
End: 2025-09-05
Payer: MEDICARE

## 2025-09-05 VITALS
DIASTOLIC BLOOD PRESSURE: 78 MMHG | HEART RATE: 86 BPM | OXYGEN SATURATION: 94 % | TEMPERATURE: 99 F | RESPIRATION RATE: 16 BRPM | SYSTOLIC BLOOD PRESSURE: 128 MMHG

## 2025-09-05 DIAGNOSIS — J40 BRONCHITIS: Primary | ICD-10-CM

## 2025-09-05 PROCEDURE — 99203 OFFICE O/P NEW LOW 30 MIN: CPT

## 2025-09-05 PROCEDURE — 1036F TOBACCO NON-USER: CPT

## 2025-09-05 PROCEDURE — 1159F MED LIST DOCD IN RCRD: CPT

## 2025-09-05 RX ORDER — DOXYCYCLINE 100 MG/1
100 CAPSULE ORAL 2 TIMES DAILY
Qty: 14 CAPSULE | Refills: 0 | Status: SHIPPED | OUTPATIENT
Start: 2025-09-05 | End: 2025-09-12

## 2025-09-05 RX ORDER — BENZONATATE 200 MG/1
200 CAPSULE ORAL 3 TIMES DAILY PRN
Qty: 30 CAPSULE | Refills: 0 | Status: SHIPPED | OUTPATIENT
Start: 2025-09-05 | End: 2025-09-12

## 2025-09-05 ASSESSMENT — ENCOUNTER SYMPTOMS
SHORTNESS OF BREATH: 0
NAUSEA: 0
SINUS PRESSURE: 1
DIARRHEA: 0
SINUS PAIN: 1
COUGH: 1
TROUBLE SWALLOWING: 0
RHINORRHEA: 1
LOSS OF SENSATION IN FEET: 0
CONSTIPATION: 0
CHILLS: 0
FEVER: 0
DEPRESSION: 0
PALPITATIONS: 0
WHEEZING: 1
CHEST TIGHTNESS: 0
OCCASIONAL FEELINGS OF UNSTEADINESS: 0

## 2025-09-05 ASSESSMENT — PATIENT HEALTH QUESTIONNAIRE - PHQ9
1. LITTLE INTEREST OR PLEASURE IN DOING THINGS: NOT AT ALL
SUM OF ALL RESPONSES TO PHQ9 QUESTIONS 1 AND 2: 0
2. FEELING DOWN, DEPRESSED OR HOPELESS: NOT AT ALL

## 2026-01-19 ENCOUNTER — APPOINTMENT (OUTPATIENT)
Dept: PRIMARY CARE | Facility: CLINIC | Age: 81
End: 2026-01-19
Payer: MEDICARE